# Patient Record
Sex: FEMALE | Race: WHITE | Employment: OTHER | URBAN - NONMETROPOLITAN AREA
[De-identification: names, ages, dates, MRNs, and addresses within clinical notes are randomized per-mention and may not be internally consistent; named-entity substitution may affect disease eponyms.]

---

## 2017-09-15 ENCOUNTER — HOSPITAL ENCOUNTER (OUTPATIENT)
Dept: MAMMOGRAPHY | Age: 47
Discharge: HOME | End: 2017-09-15
Payer: COMMERCIAL

## 2017-09-15 DIAGNOSIS — Z12.39 SCREENING BREAST EXAMINATION: ICD-10-CM

## 2017-09-15 PROCEDURE — G0202 SCR MAMMO BI INCL CAD: HCPCS

## 2018-11-30 ENCOUNTER — HOSPITAL ENCOUNTER (OUTPATIENT)
Dept: MAMMOGRAPHY | Age: 48
Discharge: HOME | End: 2018-11-30
Payer: COMMERCIAL

## 2018-11-30 DIAGNOSIS — Z12.39 SCREENING BREAST EXAMINATION: ICD-10-CM

## 2018-11-30 PROCEDURE — 77063 BREAST TOMOSYNTHESIS BI: CPT

## 2018-12-28 ENCOUNTER — HOSPITAL ENCOUNTER (EMERGENCY)
Age: 48
Discharge: HOME | End: 2018-12-28
Attending: EMERGENCY MEDICINE
Payer: COMMERCIAL

## 2018-12-28 ENCOUNTER — APPOINTMENT (OUTPATIENT)
Dept: GENERAL RADIOLOGY | Age: 48
End: 2018-12-28
Payer: COMMERCIAL

## 2018-12-28 VITALS
RESPIRATION RATE: 20 BRPM | BODY MASS INDEX: 53.22 KG/M2 | DIASTOLIC BLOOD PRESSURE: 70 MMHG | OXYGEN SATURATION: 100 % | WEIGHT: 293 LBS | TEMPERATURE: 98.3 F | SYSTOLIC BLOOD PRESSURE: 147 MMHG | HEART RATE: 73 BPM

## 2018-12-28 DIAGNOSIS — R07.89 ATYPICAL CHEST PAIN: Primary | ICD-10-CM

## 2018-12-28 LAB
ALBUMIN SERPL-MCNC: 3.1 GM/DL (ref 3.4–5)
ALP BLD-CCNC: 76 U/L (ref 46–116)
ALT SERPL-CCNC: 21 U/L (ref 14–63)
ANION GAP: 11 MEQ/L (ref 8–16)
AST SERPL-CCNC: 15 U/L (ref 15–37)
BASOPHILS # BLD: 0.4 % (ref 0–3)
BILIRUB SERPL-MCNC: 0.3 MG/DL (ref 0.2–1)
BUN BLDV-MCNC: 9 MG/DL (ref 7–18)
CHLORIDE BLD-SCNC: 104 MEQ/L (ref 98–107)
CO2: 27 MEQ/L (ref 21–32)
CREAT SERPL-MCNC: 0.8 MG/DL (ref 0.6–1.3)
EKG ATRIAL RATE: 68 BPM
EKG P AXIS: 61 DEGREES
EKG P-R INTERVAL: 164 MS
EKG Q-T INTERVAL: 428 MS
EKG QRS DURATION: 92 MS
EKG QTC CALCULATION (BAZETT): 455 MS
EKG R AXIS: 13 DEGREES
EKG T AXIS: 15 DEGREES
EKG VENTRICULAR RATE: 68 BPM
EOSINOPHILS RELATIVE PERCENT: 2.2 % (ref 0–4)
GFR, ESTIMATED: 81 ML/MIN/1.73M2
GLUCOSE BLD-MCNC: 101 MG/DL (ref 74–106)
HCT VFR BLD CALC: 34.2 % (ref 37–47)
HEMOGLOBIN: 11.6 GM/DL (ref 12–16)
INR BLD: 1.05 (ref 0.85–1.13)
LIPASE: 108 U/L (ref 73–393)
LYMPHOCYTES # BLD: 28.2 % (ref 15–47)
MCH RBC QN AUTO: 28.2 PG (ref 27–31)
MCHC RBC AUTO-ENTMCNC: 34 GM/DL (ref 33–37)
MCV RBC AUTO: 83 FL (ref 81–99)
MONOCYTES: 9.2 % (ref 0–12)
PDW BLD-RTO: 14.4 % (ref 11.5–14.5)
PLATELET # BLD: 264 THOU/MM3 (ref 130–400)
PMV BLD AUTO: 8.5 FL (ref 7.4–10.4)
POC CALCIUM: 8.4 MG/DL (ref 8.5–10.1)
POTASSIUM SERPL-SCNC: 3.8 MEQ/L (ref 3.5–5.1)
RBC # BLD: 4.12 MILL/MM3 (ref 4.2–5.4)
SEGS: 60 % (ref 43–75)
SODIUM BLD-SCNC: 142 MEQ/L (ref 136–145)
TOTAL PROTEIN: 6.8 GM/DL (ref 6.4–8.2)
TROPONIN I: < 0.017 NG/ML (ref 0.02–0.05)
WBC # BLD: 8.4 THOU/MM3 (ref 4.8–10.8)

## 2018-12-28 PROCEDURE — 93005 ELECTROCARDIOGRAM TRACING: CPT | Performed by: EMERGENCY MEDICINE

## 2018-12-28 PROCEDURE — 36415 COLL VENOUS BLD VENIPUNCTURE: CPT

## 2018-12-28 PROCEDURE — 85610 PROTHROMBIN TIME: CPT

## 2018-12-28 PROCEDURE — 71045 X-RAY EXAM CHEST 1 VIEW: CPT

## 2018-12-28 PROCEDURE — 2709999900 HC NON-CHARGEABLE SUPPLY

## 2018-12-28 PROCEDURE — 84484 ASSAY OF TROPONIN QUANT: CPT

## 2018-12-28 PROCEDURE — 80053 COMPREHEN METABOLIC PANEL: CPT

## 2018-12-28 PROCEDURE — 99285 EMERGENCY DEPT VISIT HI MDM: CPT

## 2018-12-28 PROCEDURE — 83690 ASSAY OF LIPASE: CPT

## 2018-12-28 PROCEDURE — 85025 COMPLETE CBC W/AUTO DIFF WBC: CPT

## 2018-12-28 PROCEDURE — 93010 ELECTROCARDIOGRAM REPORT: CPT | Performed by: INTERNAL MEDICINE

## 2018-12-28 RX ORDER — LEVOTHYROXINE SODIUM 0.1 MG/1
100 TABLET ORAL DAILY
COMMUNITY

## 2018-12-28 ASSESSMENT — PAIN SCALES - GENERAL: PAINLEVEL_OUTOF10: 0

## 2018-12-28 ASSESSMENT — HEART SCORE: ECG: 0

## 2021-04-01 ENCOUNTER — HOSPITAL ENCOUNTER (OUTPATIENT)
Dept: CT IMAGING | Age: 51
Discharge: HOME | End: 2021-04-01
Payer: COMMERCIAL

## 2021-04-01 DIAGNOSIS — R19.00 ABDOMINAL MASS, UNSPECIFIED ABDOMINAL LOCATION: ICD-10-CM

## 2021-04-01 PROCEDURE — 6360000004 HC RX CONTRAST MEDICATION: Performed by: FAMILY MEDICINE

## 2021-04-01 PROCEDURE — 74177 CT ABD & PELVIS W/CONTRAST: CPT

## 2021-04-01 RX ADMIN — IOHEXOL 50 ML: 240 INJECTION, SOLUTION INTRATHECAL; INTRAVASCULAR; INTRAVENOUS; ORAL at 18:13

## 2021-04-01 RX ADMIN — IOPAMIDOL 100 ML: 755 INJECTION, SOLUTION INTRAVENOUS at 18:13

## 2021-04-12 ENCOUNTER — OFFICE VISIT (OUTPATIENT)
Dept: SURGERY | Age: 51
End: 2021-04-12
Payer: COMMERCIAL

## 2021-04-12 VITALS
HEIGHT: 68 IN | TEMPERATURE: 97 F | HEART RATE: 90 BPM | SYSTOLIC BLOOD PRESSURE: 133 MMHG | RESPIRATION RATE: 15 BRPM | OXYGEN SATURATION: 98 % | BODY MASS INDEX: 44.41 KG/M2 | DIASTOLIC BLOOD PRESSURE: 72 MMHG | WEIGHT: 293 LBS

## 2021-04-12 DIAGNOSIS — R22.2 ABDOMINAL WALL MASS: Primary | ICD-10-CM

## 2021-04-12 DIAGNOSIS — E66.3 PATIENT OVERWEIGHT: ICD-10-CM

## 2021-04-12 PROCEDURE — 99203 OFFICE O/P NEW LOW 30 MIN: CPT | Performed by: SURGERY

## 2021-04-12 NOTE — PROGRESS NOTES
Daniele Eden MD   General Surgery  New Patient Evaluation in Office  Pt Name: Darwin Renteria  Date of Birth 1970   Today's Date: 4/12/2021  Medical Record Number: [id number]  Referring Provider: Ruben Ruffin MD  Primary Care Provider: Arpan Amador MD  Chief Complaint:  Chief Complaint   Patient presents with   [facility] Surgical Consult     new patient--ref by Dr. Jessica Ruelas for lump on right side of abdomen-CT abd/pelvis 4/1       ASSESSMENT      1. Abdominal wall mass    No clinical mass which requires excision   2. History of tachycardia  3. Overweight BMI 53  PLANS      1. CT imaging reviewed and reviewed with the patient. No CT evidence of mass or abdominal wall mass on imaging. 2.  Clinical examination no clinically significant palpable abdominal wall mass no pain or tenderness. No surgical intervention recommended. Patient very satisfied. 1.  Obtain old records from gastric bypass surgery when she stated she had a cyst removed. She is uncertain what type of cyst she had removed. Bariatric center where she had surgery is no longer open this may be difficult. 4.  Patient wishes to follow-up as needed. Call with any changes. Rogelio Maravilla is a 46y.o. year old female who is presenting today in the office for evaluation of a \" Lump\" on the right side of the abdomen. Colette Barker had gastric bypass surgery in [place] in 2004. She stated she had a cyst removed at that time. She states she feels like she has felt a lump on the right side of the abdomen. She states this is in a similar area to where this cyst was removed before. She cannot really state if it was an abdominal wall cyst or an intra-abdominal cyst that was removed at the time of her bariatric surgery. She really has no symptoms of pain. On physical examination there is no hernia palpable and no discrete abdominal wall mass palpable. She may be feeling some fat or fat necrosis.   CT imaging reviewed and reviewed with patient no corresponding abnormalities in the area where she points. Other insignificant findings. Past Medical History  Past Medical History:   Diagnosis Date    Tachycardia     Dr. Nile Hayes Thyroid disease        Past Surgical History  Past Surgical History:   Procedure Laterality Date   [facility]- Dr. Zafar Duran  2004    with cyst removal-bariatric center in [place], [place]- Dr. Von Tinajero       Medications  Current Outpatient Medications   Medication Sig Dispense Refill    levothyroxine (SYNTHROID) 100 MCG tablet Take 100 mcg by mouth Daily      metoprolol tartrate (LOPRESSOR) 25 MG tablet Take 25 mg by mouth 2 times daily      Multiple Minerals-Vitamins (JOHNNY-MAG ZINC II PO) Take by mouth      Levonorgest-Eth Estrad 91-Day (SEASONIQUE PO) Take  by mouth daily.  acetaminophen (TYLENOL) 325 MG tablet Take 650 mg by mouth every 6 hours as needed for Pain       No current facility-administered medications for this visit.       Allergies     Allergies   Allergen Reactions    Aspirin        Family History  Family History   Problem Relation Age of Onset    High Blood Pressure Mother     Diabetes Father     High Blood Pressure Father     Heart Disease Father        SocialHistory  Social History     Socioeconomic History    Marital status: Single     Spouse name: Not on file    Number of children: Not on file    Years of education: Not on file    Highest education level: Not on file   Occupational History    Not on file   Social Needs    Financial resource strain: Not on file    Food insecurity     Worry: Not on file     Inability: Not on file    Transportation needs     Medical: Not on file     Non-medical: Not on file   Tobacco Use    Smoking status: Former Smoker     Quit date: 04/2011     Years since quitting: 10.0    Smokeless tobacco: Never Used   Substance and Sexual Activity    Alcohol use: Yes     Comment: Occasional wine    Drug use: No    Sexual activity: Not on file   Lifestyle    Physical activity     Days per week: Not on file     Minutes per session: Not on file    Stress: Not on file   Relationships    Social connections     Talks on phone: Not on file     Gets together: Not on file     Attends Gnosticism service: Not on file     Active member of club or organization: Not on file     Attends meetings of clubs or organizations: Not on file     Relationship status: Not on file    Intimate partner violence     Fear of current or ex partner: Not on file     Emotionally abused: Not on file     Physically abused: Not on file     Forced sexual activity: Not on file   Other Topics Concern    Not on file   Social History Narrative    Not on file           Review of Systems  Review of Systems   Constitutional: Negative for activity change, appetite change and chills. HENT: Negative for congestion and sore throat. Respiratory: Negative for cough and chest tightness. Cardiovascular: Negative for chest pain. Gastrointestinal: Negative for abdominal pain, constipation and diarrhea. Endocrine: Positive for heat intolerance. Negative for polydipsia. Genitourinary: Negative for dysuria. Musculoskeletal: Positive for arthralgias. Negative for myalgias. Skin: Negative for color change and rash. Neurological: Negative for light-headedness and headaches. Hematological: Negative for adenopathy. Does not bruise/bleed easily. Psychiatric/Behavioral: Negative for agitation and confusion. OBJECTIVE     /72 (Site: Right Lower Arm, Position: Sitting, Cuff Size: Medium Adult)   Pulse 90   Temp 97 °F (36.1 °C) (Tympanic)   Resp 15   Ht 5' 8\" (1.727 m)   Wt (!) 350 lb (158.8 kg)   SpO2 98%   BMI 53.22 kg/m²      Physical Exam  Vitals signs reviewed. Constitutional:       Appearance: Normal appearance. She is well-developed. She is obese. She is not ill-appearing or diaphoretic. HENT:      Head: Normocephalic and atraumatic. Nose: No congestion. Eyes:      General: No scleral icterus. Extraocular Movements: Extraocular movements intact. Pupils: Pupils are equal, round, and reactive to light. Neck:      Thyroid: No thyromegaly. Vascular: No JVD. Trachea: No tracheal deviation. Comments: No jugular venous distention  Cardiovascular:      Rate and Rhythm: Normal rate. Heart sounds: Normal heart sounds. Pulmonary:      Effort: Pulmonary effort is normal. No respiratory distress. Abdominal:      General: There is no distension. Palpations: Abdomen is soft. There is no mass. Tenderness: There is no abdominal tenderness. There is no guarding or rebound. Hernia: No hernia is present. Musculoskeletal: Normal range of motion. Skin:     General: Skin is warm and dry. Coloration: Skin is not jaundiced. Findings: No rash. Neurological:      General: No focal deficit present. Mental Status: She is alert and oriented to person, place, and time. Cranial Nerves: No cranial nerve deficit. Psychiatric:         Mood and Affect: Mood normal.         Thought Content: Thought content normal.         Lab Results   Component Value Date    WBC 8.4 12/28/2018    HGB 11.6 (L) 12/28/2018    HCT 34.2 (L) 12/28/2018     12/28/2018    ALT 21 12/28/2018    AST 15 12/28/2018     12/28/2018    K 3.8 12/28/2018     12/28/2018    CREATININE 0.8 12/28/2018    BUN 9 12/28/2018    CO2 27 12/28/2018    INR 1.05 12/28/2018        CLINICAL INFORMATION: Abdominal mass, unspecified abdominal location .  Status post cholecystectomy and gastric bypass surgery        COMPARISON: None.       TECHNIQUE: Axial 5 mm CT images were obtained through the abdomen and pelvis after the administration of intravenous contrast. Coronal and sagittal reconstructions were obtained.       All CT scans at this facility use dose modulation, iterative reconstruction, and/or weight-based dosing when appropriate to reduce radiation dose to as low as reasonably achievable.       FINDINGS:           The visualized aspects of the lung bases are clear.   The base of the heart is within appropriate limits.       There is mild diffuse fatty replacement in the liver. The spleen is normal. The adrenals  are normal. This is slightly atrophic pancreas. The  patient is status post cholecystectomy.    There are parapelvic cysts versus hydronephrosis involving the left    and to a lesser extent right kidneys. There is a small parenchymal cyst in the left kidney. The left ureter appears to be normal. .. No renal masses are noted.   Corey Motto is a small hiatal hernia       No abnormalities of the small bowel loops are noted.  The IVC and aorta are of normal caliber. There is no adenopathy.       The urinary bladder is normal. This possible fibroid arising from the uterus measuring 3.2 x 2.7 cm in size. There is no pelvic free fluid.  The colon is within normal limits.  There is no adenopathy. No other degenerative changes in the lumbar spine. .                   Impression       1. Status post cholecystectomy and gastric bypass surgery. 2. Mild diffuse fatty replacement in the liver. 3. Slightly atrophic pancreas. 4. Parapelvic cyst versus hydronephrosis involving the left internal Lasix and right kidneys. Small parenchymal cyst in the left kidney. 5. Lumbar spondylosis. 6. Possible fibroid arising from the uterus on the left side measuring 3.2 x 3.7 cm in size. 7. Otherwise negative CT scan of the abdomen and pelvis. .                   **This report has been created using voice recognition software. It may contain minor errors which are inherent in voice recognition technology. **       Final report electronically signed by DR Isidra Andres on 4/2/2021 9:09 AM         Imaging reviewed and reviewed with patient

## 2021-04-12 NOTE — LETTER
[address] [facility]  [place] [address] [address] [facility] [address]  Phone: [phone]  Fax: [phone]    Lamar Lundy MD        April 14, 2021    Patient: Anisa Storm   MR Number: [id number]   YOB: 1970   Date of Visit: 4/12/2021     Dear Dr. Juma Barba,    Thank you for the request for consultation for Anisa Storm to me for the evaluation of a possible abdominal wall mass. Below are the relevant portions of my assessment and plan of care. 1. Abdominal wall mass    No clinical mass which requires excision    1. CT imaging reviewed and reviewed with the patient. No CT evidence of mass or abdominal wall mass on imaging. 2.  Clinical examination no clinically significant palpable abdominal wall mass no pain or tenderness. No surgical intervention recommended. Patient very satisfied. 1.  Obtain old records from gastric bypass surgery when she stated she had a cyst removed. She is uncertain what type of cyst she had removed. Bariatric center where she had surgery is no longer open this may be difficult. 4.  Patient wishes to follow-up as needed. Call with any changes. If you have questions, please do not hesitate to call me.     Sincerely,          Lamar Lundy MD

## 2021-04-14 ASSESSMENT — ENCOUNTER SYMPTOMS
CONSTIPATION: 0
COUGH: 0
CHEST TIGHTNESS: 0
SORE THROAT: 0
COLOR CHANGE: 0
DIARRHEA: 0
ABDOMINAL PAIN: 0

## 2021-05-02 LAB
A/G RATIO: NORMAL
ALBUMIN SERPL-MCNC: 3.9 G/DL
ALP BLD-CCNC: 80 U/L
ALT SERPL-CCNC: 23 U/L
ANION GAP: 14
AST SERPL-CCNC: 20 U/L
BASOPHILS %: 1.2
BASOPHILS ABSOLUTE: 0.1 /ΜL
BILIRUB SERPL-MCNC: 0.6 MG/DL (ref 0.1–1.4)
BILIRUBIN DIRECT: 0.1 MG/DL
BILIRUBIN, INDIRECT: NORMAL
BUN BLDV-MCNC: 8 MG/DL
CALCIUM SERPL-MCNC: 8.9 MG/DL
CHLORIDE BLD-SCNC: 105 MMOL/L
CHOLESTEROL, TOTAL: 161 MG/DL
CHOLESTEROL/HDL RATIO: 3.6
CO2: 28 MMOL/L
CREAT SERPL-MCNC: 0.71 MG/DL
EOSINOPHILS %: 1.7
EOSINOPHILS ABSOLUTE: 0.1 /ΜL
GFR AFRICAN AMERICAN: >60
GFR NON-AFRICAN AMERICAN: >60
GLOBULIN: NORMAL
GLUCOSE: 91
HCT: 36.6 %
HDLC SERPL-MCNC: 45 MG/DL (ref 35–70)
HGB: 12.1
LDL CHOLESTEROL CALCULATED: 102 MG/DL (ref 0–160)
LDL/HDL RATIO: 2.3
LYMPHOCYTE %: 25.2
LYMPHOCYTES ABSOLUTE: 1.9 /ΜL
MCH RBC QN AUTO: 28 PG
MCHC RBC AUTO-ENTMCNC: 33.1 G/DL
MCV RBC AUTO: 85 FL
MONOCYTES %: 10.5
MONOCYTES ABSOLUTE: 0.8 /ΜL
NEUTROPHILS %: 61.4
NEUTROPHILS ABSOLUTE: 4.6 /ΜL
PDW BLD-RTO: 15.7 %
PLATELET COUNT: 250
PMV BLD AUTO: 11.5 FL
POTASSIUM SERPL-SCNC: 4.4 MMOL/L
PROTEIN TOTAL: 7.1 G/DL
RBC: 4.33
SODIUM BLD-SCNC: 147 MMOL/L
TRIGL SERPL-MCNC: 70 MG/DL
VLDLC SERPL CALC-MCNC: 14 MG/DL
WBC: 7.5

## 2021-09-20 ENCOUNTER — ANESTHESIA EVENT (OUTPATIENT)
Dept: ENDOSCOPY | Age: 51
End: 2021-09-20
Payer: COMMERCIAL

## 2021-09-20 ENCOUNTER — HOSPITAL ENCOUNTER (OUTPATIENT)
Age: 51
Setting detail: OUTPATIENT SURGERY
Discharge: HOME | End: 2021-09-20
Attending: INTERNAL MEDICINE | Admitting: INTERNAL MEDICINE
Payer: COMMERCIAL

## 2021-09-20 ENCOUNTER — ANESTHESIA (OUTPATIENT)
Dept: ENDOSCOPY | Age: 51
End: 2021-09-20
Payer: COMMERCIAL

## 2021-09-20 VITALS
SYSTOLIC BLOOD PRESSURE: 132 MMHG | RESPIRATION RATE: 10 BRPM | DIASTOLIC BLOOD PRESSURE: 81 MMHG | OXYGEN SATURATION: 99 %

## 2021-09-20 VITALS
HEIGHT: 67 IN | RESPIRATION RATE: 18 BRPM | HEART RATE: 64 BPM | WEIGHT: 293 LBS | OXYGEN SATURATION: 98 % | BODY MASS INDEX: 45.99 KG/M2 | DIASTOLIC BLOOD PRESSURE: 74 MMHG | SYSTOLIC BLOOD PRESSURE: 161 MMHG | TEMPERATURE: 96.7 F

## 2021-09-20 PROCEDURE — 2500000003 HC RX 250 WO HCPCS: Performed by: NURSE ANESTHETIST, CERTIFIED REGISTERED

## 2021-09-20 PROCEDURE — 3609010600 HC COLONOSCOPY POLYPECTOMY SNARE/COLD BIOPSY: Performed by: INTERNAL MEDICINE

## 2021-09-20 PROCEDURE — 2720000010 HC SURG SUPPLY STERILE: Performed by: INTERNAL MEDICINE

## 2021-09-20 PROCEDURE — 3700000001 HC ADD 15 MINUTES (ANESTHESIA): Performed by: INTERNAL MEDICINE

## 2021-09-20 PROCEDURE — 7100000010 HC PHASE II RECOVERY - FIRST 15 MIN: Performed by: INTERNAL MEDICINE

## 2021-09-20 PROCEDURE — 6360000002 HC RX W HCPCS: Performed by: NURSE ANESTHETIST, CERTIFIED REGISTERED

## 2021-09-20 PROCEDURE — 88305 TISSUE EXAM BY PATHOLOGIST: CPT

## 2021-09-20 PROCEDURE — 3700000000 HC ANESTHESIA ATTENDED CARE: Performed by: INTERNAL MEDICINE

## 2021-09-20 PROCEDURE — 3609019800 HC COLONOSCOPY WITH SUBMUCOSAL INJECTION: Performed by: INTERNAL MEDICINE

## 2021-09-20 PROCEDURE — 2580000003 HC RX 258: Performed by: INTERNAL MEDICINE

## 2021-09-20 PROCEDURE — 2709999900 HC NON-CHARGEABLE SUPPLY: Performed by: INTERNAL MEDICINE

## 2021-09-20 PROCEDURE — 7100000011 HC PHASE II RECOVERY - ADDTL 15 MIN: Performed by: INTERNAL MEDICINE

## 2021-09-20 RX ORDER — KETAMINE HYDROCHLORIDE 50 MG/ML
INJECTION, SOLUTION, CONCENTRATE INTRAMUSCULAR; INTRAVENOUS PRN
Status: DISCONTINUED | OUTPATIENT
Start: 2021-09-20 | End: 2021-09-20 | Stop reason: SDUPTHER

## 2021-09-20 RX ORDER — SODIUM CHLORIDE 0.9 % (FLUSH) 0.9 %
5-40 SYRINGE (ML) INJECTION PRN
Status: DISCONTINUED | OUTPATIENT
Start: 2021-09-20 | End: 2021-09-20 | Stop reason: HOSPADM

## 2021-09-20 RX ORDER — LIDOCAINE HYDROCHLORIDE 20 MG/ML
INJECTION, SOLUTION INFILTRATION; PERINEURAL PRN
Status: DISCONTINUED | OUTPATIENT
Start: 2021-09-20 | End: 2021-09-20 | Stop reason: SDUPTHER

## 2021-09-20 RX ORDER — SODIUM CHLORIDE 9 MG/ML
25 INJECTION, SOLUTION INTRAVENOUS PRN
Status: DISCONTINUED | OUTPATIENT
Start: 2021-09-20 | End: 2021-09-20 | Stop reason: HOSPADM

## 2021-09-20 RX ORDER — PROPOFOL 10 MG/ML
INJECTION, EMULSION INTRAVENOUS PRN
Status: DISCONTINUED | OUTPATIENT
Start: 2021-09-20 | End: 2021-09-20 | Stop reason: SDUPTHER

## 2021-09-20 RX ORDER — SODIUM CHLORIDE 0.9 % (FLUSH) 0.9 %
5-40 SYRINGE (ML) INJECTION EVERY 12 HOURS SCHEDULED
Status: DISCONTINUED | OUTPATIENT
Start: 2021-09-20 | End: 2021-09-20 | Stop reason: HOSPADM

## 2021-09-20 RX ADMIN — KETAMINE HYDROCHLORIDE 10 MG: 50 INJECTION, SOLUTION INTRAMUSCULAR; INTRAVENOUS at 09:54

## 2021-09-20 RX ADMIN — SODIUM CHLORIDE 1000 ML: 9 INJECTION, SOLUTION INTRAVENOUS at 08:40

## 2021-09-20 RX ADMIN — LIDOCAINE HYDROCHLORIDE 100 MG: 20 INJECTION, SOLUTION INFILTRATION; PERINEURAL at 09:05

## 2021-09-20 RX ADMIN — PROPOFOL 550 MG: 10 INJECTION, EMULSION INTRAVENOUS at 09:13

## 2021-09-20 RX ADMIN — PROPOFOL 100 MG: 10 INJECTION, EMULSION INTRAVENOUS at 09:05

## 2021-09-20 RX ADMIN — PROPOFOL 50 MG: 10 INJECTION, EMULSION INTRAVENOUS at 09:08

## 2021-09-20 RX ADMIN — KETAMINE HYDROCHLORIDE 20 MG: 50 INJECTION, SOLUTION INTRAMUSCULAR; INTRAVENOUS at 09:25

## 2021-09-20 RX ADMIN — KETAMINE HYDROCHLORIDE 20 MG: 50 INJECTION, SOLUTION INTRAMUSCULAR; INTRAVENOUS at 09:40

## 2021-09-20 ASSESSMENT — PAIN SCALES - GENERAL
PAINLEVEL_OUTOF10: 0
PAINLEVEL_OUTOF10: 0

## 2021-09-20 ASSESSMENT — ENCOUNTER SYMPTOMS: SHORTNESS OF BREATH: 0

## 2021-09-20 ASSESSMENT — PAIN - FUNCTIONAL ASSESSMENT: PAIN_FUNCTIONAL_ASSESSMENT: 0-10

## 2021-09-20 NOTE — PROGRESS NOTES
Colonoscopy completed. Pictures taken. 1 polyp found. Polyp removed with cold snare. Specimen fixed in formalin. Specimen labeled and sent to lab. 5 ml Spot injected into polyp site. Patient tolerated well. Scope# [id number] used.

## 2021-09-20 NOTE — ANESTHESIA POSTPROCEDURE EVALUATION
Department of Anesthesiology  Postprocedure Note    Patient: Wanda Donahue  MRN: [id number]  YOB: 1970  Date of evaluation: 9/20/2021  Time:  10:14 AM     Procedure Summary     Date: 09/20/21 Room / Location: [address] [place] [address] / [address] [place] [address]    Anesthesia Start: 0296 Anesthesia Stop: 1004    Procedures:       COLONOSCOPY POLYPECTOMY SNARE/COLD BIOPSY (N/A )      COLONOSCOPY SUBMUCOSAL/BOTOX INJECTION Diagnosis: (RECTAL BLEED, ANAL PAIN)    Surgeons: Dina Stein MD Responsible Provider: Humble White MD    Anesthesia Type: MAC, general ASA Status: 3          Anesthesia Type: MAC, general    Malcolm Phase I: Malcolm Score: 10    Malcolm Phase II: Malcolm Score: 10    Last vitals: Reviewed and per EMR flowsheets.        Anesthesia Post Evaluation    Patient location during evaluation: bedside  Patient participation: complete - patient participated  Level of consciousness: awake and alert  Pain score: 0  Airway patency: patent  Nausea & Vomiting: no nausea and no vomiting  Complications: no  Cardiovascular status: hemodynamically stable  Respiratory status: room air, spontaneous ventilation and acceptable  Hydration status: stable

## 2021-09-20 NOTE — BRIEF OP NOTE
Brief Postoperative Note      Patient: Anaya Mcdowell  YOB: 1970  MRN: [id number]    Date of Procedure: 9/20/2021    Pre-Op Diagnosis: RECTAL BLEED, ANAL PAIN    Post-Op Diagnosis: Same       Procedure(s):  COLONOSCOPY POLYPECTOMY SNARE/COLD BIOPSY  COLONOSCOPY SUBMUCOSAL/BOTOX INJECTION    Surgeon(s):   Chetan Elizabeth MD    Assistant:  * No surgical staff found *    Anesthesia: Monitor Anesthesia Care    Estimated Blood Loss (mL): Minimal    Complications: None    Specimens:   ID Type Source Tests Collected by Time Destination   A : transverse polyp Tissue Colon SURGICAL PATHOLOGY Chetan Elizabeth MD 9/20/2021 4465        Implants:  * No implants in log *      Drains: * No LDAs found *    Findings: above    Electronically signed by Chetan Elizabeth MD on 9/20/2021 at 10:04 AM

## 2021-09-20 NOTE — OP NOTE
Operative Note      Patient: Ana Lilia Altman  YOB: 1970  MRN: [id number]    Date of Procedure: 9/20/2021    Pre-Op Diagnosis: RECTAL BLEED, ANAL PAIN    Post-Op Diagnosis: Same       Procedure(s):  COLONOSCOPY POLYPECTOMY SNARE/COLD BIOPSY  COLONOSCOPY SUBMUCOSAL/BOTOX INJECTION    Surgeon(s):   Demetrice Doyle MD    Assistant:   * No surgical staff found *    Anesthesia: Monitor Anesthesia Care    Estimated Blood Loss (mL): Minimal    Complications: None    Specimens:   ID Type Source Tests Collected by Time Destination   A : transverse polyp Tissue Colon SURGICAL PATHOLOGY Demetrice Doyle MD 9/20/2021 5555        Implants:  * No implants in log *      Drains: * No LDAs found *    Findings: above    Detailed Description of Procedure:   Dictated      Electronically signed by Demetrice Doyle MD on 9/20/2021 at 10:02 AM

## 2021-09-20 NOTE — H&P
[address] [place] [address]                              HISTORY AND PHYSICAL    PATIENT NAME: Thierry Lutz                 :        1970  MED REC NO:   [id number]                           ROOM:  ACCOUNT NO:   [de-identified]                           ADMIT DATE: 2021  PROVIDER:     Eagle Monet. Sirena Heller M.D. REASON FOR VISIT:  Anal pain and rectal bleeding. HISTORY OF PRESENT ILLNESS:  The patient is a 55-year-old female who has  had blood per rectum as well as some anal pain, suspected anal fissure. We are thus planning colonoscopy. PAST MEDICAL HISTORY:  Previously listed and unchanged. MEDICATIONS:  Previously listed and unchanged. ALLERGIES:  PREVIOUSLY LISTED AND UNCHANGED. SOCIAL HISTORY:  Previously listed and unchanged. FAMILY HISTORY:  Previously listed and unchanged. REVIEW OF SYSTEMS:  Previously listed and unchanged. PHYSICAL EXAMINATION:  GENERAL:  Awake, alert, and oriented x3. VITAL SIGNS:  The patient is afebrile. Heart rate 83, respiratory rate  16, blood pressure is 170/84, oxygen saturation 97%. HEENT:  Normocephalic and atraumatic. NECK:  Supple. No JVD. LUNGS:  Clear. HEART:  Regular rate. ABDOMEN:  Soft, nontender. No guarding. RECTAL:  Will be done with colonoscopy. EXTREMITIES:  Without edema. NEUROLOGIC:  Awake, alert, and oriented x3. IMPRESSION:  1. Rectal bleeding. 2.  Anal pain. PLAN:  Colonoscopy today. BOLIVAR Frankel M.D.    D: 2021 9:01:31       T: 2021 9:38:53     [email or web address]  Job#: [id number]     Doc#: [id number]    CC:

## 2021-09-20 NOTE — ANESTHESIA PRE PROCEDURE
Department of Anesthesiology  Preprocedure Note       Name:  Ronald Tello   Age:  46 y.o.  :  1970                                          MRN:  [id number]         Date:  2021      Surgeon: June Coelho): Jennifer Ferreira MD    Procedure: Procedure(s):  COLONOSCOPY    Medications prior to admission:   Prior to Admission medications    Medication Sig Start Date End Date Taking? Authorizing Provider   valACYclovir (VALTREX) 500 MG tablet Take 1 tablet by mouth 3 times daily as needed (COLD SORES) 21  Yes Tejinder Rodrigues MD   hydrocortisone (ANUSOL-HC) 2.5 % CREA rectal cream Apply to affected are bid prn x 5-7 days 21  Yes Fabi Andrade APRN - CNP   levothyroxine (SYNTHROID) 100 MCG tablet Take 100 mcg by mouth Daily   Yes Historical Provider, MD   metoprolol tartrate (LOPRESSOR) 25 MG tablet Take 25 mg by mouth 2 times daily   Yes Historical Provider, MD   acetaminophen (TYLENOL) 325 MG tablet Take 650 mg by mouth every 6 hours as needed for Pain   Yes Historical Provider, MD   Multiple Minerals-Vitamins (JOHNNY-MAG ZINC II PO) Take by mouth   Yes Historical Provider, MD   Levonorgest-Eth Estrad 91-Day (SEASONIQUE PO) Take  by mouth daily. Yes Historical Provider, MD       Current medications:    Current Facility-Administered Medications   Medication Dose Route Frequency Provider Last Rate Last Admin    sodium chloride flush 0.9 % injection 5-40 mL  5-40 mL IntraVENous 2 times per day Jennifer Ferreira MD        sodium chloride flush 0.9 % injection 5-40 mL  5-40 mL IntraVENous PRN Jennifer Ferreira MD        0.9 % sodium chloride infusion  25 mL IntraVENous PRN Jennifer Ferreira  mL/hr at 21 0840 1,000 mL at 21 0840       Allergies:     Allergies   Allergen Reactions    Aspirin        Problem List:    Patient Active Problem List   Diagnosis Code    Hypokalemia E87.6    Knee pain, left, spur related M25.562    Torn meniscusleft S83.209A    Heart burn, frequent R12    Arrhythmia I49.9    Patient overweight E66.3    Gall bladder disease K82.9    Heart palpitations R00.2    Gastritis and duodenitis K29.90    Diarrhea R19.7       Past Medical History:        Diagnosis Date    Tachycardia     Dr. Leander Maza Thyroid disease        Past Surgical History:        Procedure Laterality Date   Luly Alexis Dr. Like   Roger  2004    with cyst removal-bariatric center in [facility]  Minus Clonts       Social History:    Social History     Tobacco Use    Smoking status: Former Smoker     Quit date: 04/2011     Years since quitting: 10.4    Smokeless tobacco: Never Used   Substance Use Topics    Alcohol use: Yes     Comment: Occasional wine                                Counseling given: Not Answered      Vital Signs (Current):   Vitals:    09/20/21 0802   BP: (!) 170/84   Pulse: 83   Resp: 16   Temp: 36.1 °C (97 °F)   SpO2: 97%   Weight: 300 lb (136.1 kg)   Height: 5' 6.5\" (1.689 m)                                              BP Readings from Last 3 Encounters:   09/20/21 (!) 170/84   07/16/21 134/82   04/12/21 133/72       NPO Status: Time of last liquid consumption: 0015                        Time of last solid consumption: 2000                        Date of last liquid consumption: 09/20/21                        Date of last solid food consumption: 09/18/21    BMI:   Wt Readings from Last 3 Encounters:   09/20/21 300 lb (136.1 kg)   04/12/21 (!) 350 lb (158.8 kg)   12/28/18 (!) 350 lb (158.8 kg)     Body mass index is 47.7 kg/m².     CBC:   Lab Results   Component Value Date    WBC 8.4 12/28/2018    RBC 4.12 12/28/2018    HGB 11.6 12/28/2018    HCT 34.2 12/28/2018    MCV 83.0 12/28/2018    RDW 14.4 12/28/2018     12/28/2018       CMP:   Lab Results   Component Value Date     12/28/2018    K 3.8 12/28/2018     12/28/2018    CO2 27 12/28/2018    BUN 9 12/28/2018    CREATININE 0.8 12/28/2018    GLUCOSE 101

## 2021-09-20 NOTE — PROGRESS NOTES
Recovery mode, denies discomfort. Passing gas, taking fluids. Dr. Racheal Smith discussed findings with pt and . Discharge instructions provided and understanding verbalized.

## 2022-01-24 PROBLEM — L01.00 IMPETIGO: Status: ACTIVE | Noted: 2020-08-19

## 2022-05-20 VITALS — DIASTOLIC BLOOD PRESSURE: 77 MMHG | SYSTOLIC BLOOD PRESSURE: 150 MMHG | WEIGHT: 293 LBS | BODY MASS INDEX: 51.09 KG/M2

## 2022-05-20 RX ORDER — NITROGLYCERIN 0.4 MG/1
0.4 TABLET SUBLINGUAL PRN
COMMUNITY
End: 2022-09-28 | Stop reason: SDUPTHER

## 2022-08-22 ENCOUNTER — HOSPITAL ENCOUNTER (OUTPATIENT)
Dept: MAMMOGRAPHY | Age: 52
Discharge: HOME | End: 2022-08-22
Payer: COMMERCIAL

## 2022-08-22 ENCOUNTER — HOSPITAL ENCOUNTER (OUTPATIENT)
Dept: WOMENS IMAGING | Age: 52
Discharge: HOME | End: 2022-08-22

## 2022-08-22 DIAGNOSIS — Z00.6 ENCOUNTER FOR EXAMINATION FOR NORMAL COMPARISON OR CONTROL IN CLINICAL RESEARCH PROGRAM: ICD-10-CM

## 2022-08-22 DIAGNOSIS — Z12.39 SCREENING BREAST EXAMINATION: ICD-10-CM

## 2022-08-22 PROCEDURE — 77063 BREAST TOMOSYNTHESIS BI: CPT

## 2022-09-22 PROBLEM — R00.0 TACHYCARDIA: Status: ACTIVE | Noted: 2022-09-22

## 2022-09-22 PROBLEM — B00.1 RECURRENT COLD SORES: Status: ACTIVE | Noted: 2022-09-22

## 2022-09-22 PROBLEM — L01.00 IMPETIGO: Status: RESOLVED | Noted: 2020-08-19 | Resolved: 2022-09-22

## 2022-09-22 PROBLEM — M23.206 OLD TEAR OF MENISCUS OF RIGHT KNEE: Status: ACTIVE | Noted: 2022-09-22

## 2022-09-28 ENCOUNTER — OFFICE VISIT (OUTPATIENT)
Dept: CARDIOLOGY CLINIC | Age: 52
End: 2022-09-28
Payer: COMMERCIAL

## 2022-09-28 VITALS
HEART RATE: 70 BPM | BODY MASS INDEX: 47.09 KG/M2 | SYSTOLIC BLOOD PRESSURE: 128 MMHG | DIASTOLIC BLOOD PRESSURE: 84 MMHG | RESPIRATION RATE: 20 BRPM | HEIGHT: 66 IN | WEIGHT: 293 LBS

## 2022-09-28 DIAGNOSIS — R00.0 TACHYCARDIA: Primary | ICD-10-CM

## 2022-09-28 DIAGNOSIS — E78.5 DYSLIPIDEMIA (HIGH LDL; LOW HDL): ICD-10-CM

## 2022-09-28 PROCEDURE — 93000 ELECTROCARDIOGRAM COMPLETE: CPT | Performed by: INTERNAL MEDICINE

## 2022-09-28 PROCEDURE — 99213 OFFICE O/P EST LOW 20 MIN: CPT | Performed by: INTERNAL MEDICINE

## 2022-09-28 RX ORDER — VALACYCLOVIR HYDROCHLORIDE 1 G/1
TABLET, FILM COATED ORAL
COMMUNITY
Start: 2022-09-24

## 2022-09-28 RX ORDER — NITROGLYCERIN 0.4 MG/1
0.4 TABLET SUBLINGUAL PRN
Qty: 25 TABLET | Refills: 3 | Status: SHIPPED | OUTPATIENT
Start: 2022-09-28

## 2022-09-28 ASSESSMENT — ENCOUNTER SYMPTOMS
ABDOMINAL DISTENTION: 0
VOMITING: 0
STRIDOR: 0
COLOR CHANGE: 0
ANAL BLEEDING: 0
VOICE CHANGE: 0
ABDOMINAL PAIN: 0
BLOOD IN STOOL: 0
APNEA: 0
TROUBLE SWALLOWING: 0
SHORTNESS OF BREATH: 0
WHEEZING: 0
NAUSEA: 0
CHEST TIGHTNESS: 0
COUGH: 0
CHOKING: 0

## 2022-09-28 NOTE — PROGRESS NOTES
[address] [facility] [[email or web address]]  [place] [address]  Dept: [phone]  Loc: [phone]     9/28/2022       Arthur Boas is here today for   Chief Complaint   Patient presents with    Follow-up           Referring Physician:  No ref. provider found     Patient Active Problem List   Diagnosis    Knee pain, left, spur related    Old tear of meniscus of left knee    Heart burn, frequent    Arrhythmia    Hypothyroidism    Chest pain    Recurrent cold sores    Tachycardia    Old tear of meniscus of right knee       Review of Systems   Constitutional:  Negative for activity change, appetite change, fatigue, fever and unexpected weight change. HENT:  Negative for congestion, trouble swallowing and voice change. Eyes:  Negative for visual disturbance. Respiratory:  Negative for apnea, cough, choking, chest tightness, shortness of breath, wheezing and stridor. Cardiovascular:  Negative for chest pain and leg swelling. Gastrointestinal:  Negative for abdominal distention, abdominal pain, anal bleeding, blood in stool, nausea and vomiting. Endocrine: Negative for cold intolerance and heat intolerance. Genitourinary:  Negative for hematuria. Musculoskeletal:  Negative for arthralgias, gait problem, joint swelling and myalgias. Skin:  Negative for color change and rash. Allergic/Immunologic: Negative for environmental allergies and food allergies. Neurological:  Negative for dizziness, tremors, syncope, facial asymmetry, weakness, light-headedness, numbness and headaches. Hematological:  Does not bruise/bleed easily. Psychiatric/Behavioral:  Negative for agitation, behavioral problems and sleep disturbance.        Past Medical History:   Diagnosis Date    Hypertension        Allergies   Allergen Reactions    Aspirin Other (See Comments)     Hx gastric bypass    Ibuprofen Other (See Comments)     Hx gastric bypass Current Outpatient Medications   Medication Sig Dispense Refill    valACYclovir (VALTREX) 1 g tablet take 2 tablets by mouth twice a day for 1 day      metoprolol tartrate (LOPRESSOR) 25 MG tablet Take 1 tablet by mouth daily 90 tablet 3    nitroGLYCERIN (NITROSTAT) 0.4 MG SL tablet Place 1 tablet under the tongue as needed for Chest pain 25 tablet 3    b complex vitamins capsule Take 1 capsule by mouth daily      predniSONE (DELTASONE) 10 MG tablet Take 4 tablets by mouth daily for 2 days, THEN 3 tablets daily for 2 days, THEN 2 tablets daily for 2 days, THEN 1 tablet daily for 2 days. 20 tablet 0    levothyroxine (SYNTHROID) 100 MCG tablet Take 100 mcg by mouth Daily      acetaminophen (TYLENOL) 325 MG tablet Take 650 mg by mouth every 6 hours as needed for Pain      Multiple Minerals-Vitamins (JOHNNY-MAG ZINC II PO) Take by mouth       No current facility-administered medications for this visit. Social History     Socioeconomic History    Marital status: Single     Spouse name: None    Number of children: None    Years of education: None    Highest education level: None   Tobacco Use    Smoking status: Former     Types: Cigarettes     Quit date: 2011     Years since quittin.5    Smokeless tobacco: Never   Substance and Sexual Activity    Alcohol use: Yes     Comment: Occasional wine    Drug use: No       Family History   Problem Relation Age of Onset    High Blood Pressure Mother     Diabetes Father     High Blood Pressure Father     Heart Disease Father        Blood pressure 128/84, pulse 70, resp. rate 20, height 5' 6\" (1.676 m), weight 295 lb 9.6 oz (134.1 kg), last menstrual period 2013, not currently breastfeeding.     Physical Exam:    General Appearance: alert and oriented to person, place and time, in no acute distress  Cardiovascular: normal rate, regular rhythm, normal S1 and S2, no murmurs, rubs, clicks, or gallops, distal pulses intact, no carotid bruits, no JVD  Pulmonary/Chest: clear to auscultation bilaterally- no wheezes, rales or rhonchi, normal air movement, no respiratory distress  Abdomen: soft, non-tender, non-distended, normal bowel sounds, no masses   Extremities: no cyanosis, clubbing or edema, pulse   Skin: warm and dry, no rash or erythema  Head: normocephalic and atraumatic  Eyes: pupils equal, round, and reactive to light  Neck: supple and non-tender without mass, no thyromegaly   Musculoskeletal: normal range of motion, no joint swelling, deformity or tenderness  Neurological: alert, oriented, normal speech, no focal findings or movement disorder noted    Lab Data:    Cardiac Enzymes:  No results for input(s): CKTOTAL, CKMB, CKMBINDEX, TROPONINI in the last 72 hours.     CBC:   Lab Results   Component Value Date/Time    WBC 5.8 09/27/2022 11:06 AM    WBC 8.4 12/28/2018 01:25 AM    RBC 4.53 09/27/2022 11:06 AM    HGB 13.2 09/27/2022 11:06 AM    HCT 39.5 09/27/2022 11:06 AM     09/27/2022 11:06 AM     05/01/2021 12:00 AM       CMP:    Lab Results   Component Value Date/Time     09/27/2022 11:06 AM    K 4.2 09/27/2022 11:06 AM     09/27/2022 11:06 AM    CO2 29 09/27/2022 11:06 AM    BUN 9 09/27/2022 11:06 AM    CREATININE 0.63 09/27/2022 11:06 AM    GFRAA >60 05/01/2021 12:00 AM    LABGLOM >60 05/01/2021 12:00 AM    GLUCOSE 79 09/27/2022 11:06 AM    CALCIUM 9.4 09/27/2022 11:06 AM       Hepatic Function Panel:    Lab Results   Component Value Date/Time    ALKPHOS 122 09/27/2022 11:06 AM    ALKPHOS 80 05/01/2021 12:00 AM    ALT 16 09/27/2022 11:06 AM    AST 18 09/27/2022 11:06 AM    PROT 6.7 09/27/2022 11:06 AM    PROT 7.1 05/01/2021 12:00 AM    BILITOT 0.7 09/27/2022 11:06 AM    BILIDIR 0.2 09/27/2022 11:06 AM    LABALBU 4.4 09/27/2022 11:06 AM       Magnesium:  No results found for: MG    PT/INR:    Lab Results   Component Value Date/Time    INR 1.05 12/28/2018 01:25 AM       HgBA1c:  No results found for: LABA1C    FLP:    Lab Results   Component Value Date/Time    TRIG 61 09/27/2022 11:06 AM    HDL 62 09/27/2022 11:06 AM    LDLCALC 84 09/27/2022 11:06 AM    LABVLDL 12 09/27/2022 11:06 AM       TSH:  No results found for: TSH     Diagnosis Orders   1. Tachycardia  83435 - [place] ELECTROCARDIOGRAM, COMPLETE    CBC    Basic Metabolic Panel    Lipid Panel    Hepatic Function Panel      2. Dyslipidemia (high LDL; low HDL)  CBC    Basic Metabolic Panel    Lipid Panel    Hepatic Function Panel           Assessment/Plan    Is lady 46years old lady morbidly obese history of hypothyroidism history of palpitation she has been on metoprolol does control her symptoms to a great degree she was instructed to take an extra dose of the metoprolol if she has the palpitation her EKG showed sinus rhythm nonspecific ST segment change the lab work within the acceptable range. The EKG finding the lab work finding plan of treatment were discussed with her in great details the patient will be seen annually advised about risk factor modification all her questions were answered her medication were reconciled and sent to her pharmacy she will be seen annually thank    Orders Placed This Encounter   Procedures    CBC     Standing Status:   Future     Standing Expiration Date:   1/49/3021    Basic Metabolic Panel     Standing Status:   Future     Standing Expiration Date:   9/28/2023    Lipid Panel     Standing Status:   Future     Standing Expiration Date:   9/28/2023     Order Specific Question:   Is Patient Fasting?/# of Hours     Answer:   12 hours    Hepatic Function Panel     Standing Status:   Future     Standing Expiration Date:   9/28/2023    73451 - [place] ELECTROCARDIOGRAM, COMPLETE       Return in about 1 year (around 9/28/2023) for palpitation.      Anna Perez MD

## 2023-09-25 RX ORDER — SODIUM CHLORIDE 0.9 % (FLUSH) 0.9 %
5-40 SYRINGE (ML) INJECTION EVERY 12 HOURS SCHEDULED
Status: DISCONTINUED | OUTPATIENT
Start: 2023-09-25 | End: 2023-10-02 | Stop reason: HOSPADM

## 2023-09-25 RX ORDER — SODIUM CHLORIDE 0.9 % (FLUSH) 0.9 %
5-40 SYRINGE (ML) INJECTION PRN
Status: DISCONTINUED | OUTPATIENT
Start: 2023-09-25 | End: 2023-10-02 | Stop reason: HOSPADM

## 2023-09-25 RX ORDER — SODIUM CHLORIDE 9 MG/ML
25 INJECTION, SOLUTION INTRAVENOUS PRN
Status: DISCONTINUED | OUTPATIENT
Start: 2023-09-25 | End: 2023-10-02 | Stop reason: HOSPADM

## 2023-09-25 RX ORDER — SODIUM CHLORIDE 9 MG/ML
INJECTION, SOLUTION INTRAVENOUS CONTINUOUS
Status: DISCONTINUED | OUTPATIENT
Start: 2023-09-25 | End: 2023-10-02 | Stop reason: HOSPADM

## 2023-09-28 ENCOUNTER — HOSPITAL ENCOUNTER (OUTPATIENT)
Dept: MAMMOGRAPHY | Age: 53
Discharge: HOME | End: 2023-09-28
Payer: COMMERCIAL

## 2023-09-28 VITALS — WEIGHT: 278 LBS | HEIGHT: 67 IN | BODY MASS INDEX: 43.63 KG/M2

## 2023-09-28 DIAGNOSIS — Z12.39 BREAST SCREENING: ICD-10-CM

## 2023-09-28 PROCEDURE — 77063 BREAST TOMOSYNTHESIS BI: CPT

## 2023-10-02 ENCOUNTER — ANESTHESIA (OUTPATIENT)
Dept: ENDOSCOPY | Age: 53
End: 2023-10-02
Payer: COMMERCIAL

## 2023-10-02 ENCOUNTER — ANESTHESIA EVENT (OUTPATIENT)
Dept: ENDOSCOPY | Age: 53
End: 2023-10-02
Payer: COMMERCIAL

## 2023-10-02 ENCOUNTER — HOSPITAL ENCOUNTER (OUTPATIENT)
Age: 53
Setting detail: OUTPATIENT SURGERY
Discharge: HOME | End: 2023-10-02
Attending: INTERNAL MEDICINE | Admitting: INTERNAL MEDICINE
Payer: COMMERCIAL

## 2023-10-02 VITALS
SYSTOLIC BLOOD PRESSURE: 148 MMHG | TEMPERATURE: 96.9 F | DIASTOLIC BLOOD PRESSURE: 66 MMHG | WEIGHT: 275.2 LBS | RESPIRATION RATE: 17 BRPM | BODY MASS INDEX: 43.19 KG/M2 | OXYGEN SATURATION: 98 % | HEIGHT: 67 IN | HEART RATE: 56 BPM

## 2023-10-02 PROCEDURE — 6360000002 HC RX W HCPCS: Performed by: NURSE ANESTHETIST, CERTIFIED REGISTERED

## 2023-10-02 PROCEDURE — 88305 TISSUE EXAM BY PATHOLOGIST: CPT

## 2023-10-02 PROCEDURE — 3700000001 HC ADD 15 MINUTES (ANESTHESIA): Performed by: INTERNAL MEDICINE

## 2023-10-02 PROCEDURE — 2580000003 HC RX 258: Performed by: INTERNAL MEDICINE

## 2023-10-02 PROCEDURE — 3700000000 HC ANESTHESIA ATTENDED CARE: Performed by: INTERNAL MEDICINE

## 2023-10-02 PROCEDURE — 7100000010 HC PHASE II RECOVERY - FIRST 15 MIN: Performed by: INTERNAL MEDICINE

## 2023-10-02 PROCEDURE — 2500000003 HC RX 250 WO HCPCS: Performed by: NURSE ANESTHETIST, CERTIFIED REGISTERED

## 2023-10-02 PROCEDURE — 3609010600 HC COLONOSCOPY POLYPECTOMY SNARE/COLD BIOPSY: Performed by: INTERNAL MEDICINE

## 2023-10-02 PROCEDURE — 7100000011 HC PHASE II RECOVERY - ADDTL 15 MIN: Performed by: INTERNAL MEDICINE

## 2023-10-02 PROCEDURE — 2709999900 HC NON-CHARGEABLE SUPPLY: Performed by: INTERNAL MEDICINE

## 2023-10-02 RX ORDER — SODIUM CHLORIDE 9 MG/ML
25 INJECTION, SOLUTION INTRAVENOUS PRN
Status: CANCELLED | OUTPATIENT
Start: 2023-10-02

## 2023-10-02 RX ORDER — SODIUM CHLORIDE 0.9 % (FLUSH) 0.9 %
5-40 SYRINGE (ML) INJECTION EVERY 12 HOURS SCHEDULED
Status: CANCELLED | OUTPATIENT
Start: 2023-10-02

## 2023-10-02 RX ORDER — SODIUM CHLORIDE 0.9 % (FLUSH) 0.9 %
5-40 SYRINGE (ML) INJECTION PRN
Status: CANCELLED | OUTPATIENT
Start: 2023-10-02

## 2023-10-02 RX ORDER — PROPOFOL 10 MG/ML
INJECTION, EMULSION INTRAVENOUS PRN
Status: DISCONTINUED | OUTPATIENT
Start: 2023-10-02 | End: 2023-10-02 | Stop reason: SDUPTHER

## 2023-10-02 RX ORDER — CLINDAMYCIN HYDROCHLORIDE 150 MG/1
150 CAPSULE ORAL 3 TIMES DAILY
COMMUNITY
Start: 2023-07-17

## 2023-10-02 RX ORDER — LIDOCAINE HYDROCHLORIDE 20 MG/ML
INJECTION, SOLUTION INFILTRATION; PERINEURAL PRN
Status: DISCONTINUED | OUTPATIENT
Start: 2023-10-02 | End: 2023-10-02 | Stop reason: SDUPTHER

## 2023-10-02 RX ORDER — AMOXICILLIN 500 MG/1
TABLET, FILM COATED ORAL
COMMUNITY
Start: 2023-08-21

## 2023-10-02 RX ORDER — TRAMADOL HYDROCHLORIDE 50 MG/1
TABLET ORAL
COMMUNITY
Start: 2023-08-21

## 2023-10-02 RX ORDER — CHLORHEXIDINE GLUCONATE ORAL RINSE 1.2 MG/ML
SOLUTION DENTAL
COMMUNITY
Start: 2023-08-21

## 2023-10-02 RX ADMIN — PROPOFOL 50 MG: 10 INJECTION, EMULSION INTRAVENOUS at 10:21

## 2023-10-02 RX ADMIN — PROPOFOL 50 MG: 10 INJECTION, EMULSION INTRAVENOUS at 10:11

## 2023-10-02 RX ADMIN — PROPOFOL 50 MG: 10 INJECTION, EMULSION INTRAVENOUS at 10:25

## 2023-10-02 RX ADMIN — PROPOFOL 50 MG: 10 INJECTION, EMULSION INTRAVENOUS at 10:29

## 2023-10-02 RX ADMIN — SODIUM CHLORIDE: 9 INJECTION, SOLUTION INTRAVENOUS at 08:24

## 2023-10-02 RX ADMIN — PROPOFOL 100 MG: 10 INJECTION, EMULSION INTRAVENOUS at 10:08

## 2023-10-02 RX ADMIN — PROPOFOL 50 MG: 10 INJECTION, EMULSION INTRAVENOUS at 10:04

## 2023-10-02 RX ADMIN — PROPOFOL 50 MG: 10 INJECTION, EMULSION INTRAVENOUS at 10:27

## 2023-10-02 RX ADMIN — PROPOFOL 50 MG: 10 INJECTION, EMULSION INTRAVENOUS at 10:23

## 2023-10-02 RX ADMIN — PROPOFOL 50 MG: 10 INJECTION, EMULSION INTRAVENOUS at 10:06

## 2023-10-02 RX ADMIN — PROPOFOL 100 MG: 10 INJECTION, EMULSION INTRAVENOUS at 10:03

## 2023-10-02 RX ADMIN — PROPOFOL 50 MG: 10 INJECTION, EMULSION INTRAVENOUS at 10:32

## 2023-10-02 RX ADMIN — LIDOCAINE HYDROCHLORIDE 100 MG: 20 INJECTION, SOLUTION INFILTRATION; PERINEURAL at 10:03

## 2023-10-02 ASSESSMENT — PAIN - FUNCTIONAL ASSESSMENT: PAIN_FUNCTIONAL_ASSESSMENT: NONE - DENIES PAIN

## 2023-10-02 NOTE — BRIEF OP NOTE
Brief Postoperative Note      Patient: Leslie Bustillos  YOB: 1970  MRN: [id number]    Date of Procedure: 10/2/2023    Pre-Op Diagnosis Codes:     * Screen for colon cancer [Z12.11]     * History of colon polyps [Z86.010]    Post-Op Diagnosis: Same       Procedure(s):  COLONOSCOPY POLYPECTOMY SNARE/COLD BIOPSY    Surgeon(s):   Marie Mckeon MD    Assistant:  * No surgical staff found *    Anesthesia: Monitor Anesthesia Care    Estimated Blood Loss (mL): Minimal    Complications: None    Specimens:   ID Type Source Tests Collected by Time Destination   A : Transverse colon polyp Tissue Colon SURGICAL PATHOLOGY Marie Mckeon MD 10/2/2023 1025        Implants:  * No implants in log *      Drains: * No LDAs found *    Findings: above      Electronically signed by Marie Mckeon MD on 10/2/2023 at 10:40 AM

## 2023-10-02 NOTE — ANESTHESIA POSTPROCEDURE EVALUATION
Department of Anesthesiology  Postprocedure Note    Patient: Scott Harmon  MRN: [id number]  YOB: 1970  Date of evaluation: 10/2/2023      Procedure Summary     Date: 10/02/23 Room / Location: [address] [place] [address] [facility] / [address] [place] [address]    Anesthesia Start: 3889 Anesthesia Stop: 3196    Procedure: COLONOSCOPY POLYPECTOMY SNARE/COLD BIOPSY Diagnosis:       Screen for colon cancer      History of colon polyps      (Screen for colon cancer [Z12.11])      (History of colon polyps [Z86.010])    Surgeons: Veronica Martinez MD Responsible Provider: Trice Hammonds DO    Anesthesia Type: MAC ASA Status: 3          Anesthesia Type: No value filed.     Malcolm Phase I: Malcolm Score: 10    Malcolm Phase II:        Anesthesia Post Evaluation    Patient location during evaluation: bedside  Patient participation: complete - patient participated  Level of consciousness: awake  Pain score: 0  Airway patency: patent  Nausea & Vomiting: no nausea and no vomiting  Complications: no  Cardiovascular status: hemodynamically stable  Respiratory status: acceptable  Hydration status: euvolemic  Pain management: satisfactory to patient

## 2023-10-02 NOTE — PROGRESS NOTES
Scope # . Colonoscopy completed, tolerated well. 1 polyp removed with cold snare, 1 jar labeled and sent to lab for processing. Photos taken.

## 2023-10-02 NOTE — PROGRESS NOTES
Recovery mode, pt denies discomfort. Passing gas, taking in fluids. Dr. Kyle Finney discussed findings with pt and responsible party. Discharge instructions provided and understanding verbalized.

## 2023-10-02 NOTE — H&P
[place] [address]                              HISTORY AND PHYSICAL    PATIENT NAME: Ana Antunez                 :        1970  MED REC NO:   [id number]                           ROOM:  ACCOUNT NO:   [de-identified]                           ADMIT DATE: 10/02/2023  PROVIDER:     Trell Gerard M.D. INDICATION:  History of polyp. Polyp was difficult to remove. Removed  it in piecemeal fashion. HISTORY OF PRESENT ILLNESS:  The patient is a 59-year-old female who we  have previously seen for colonoscopy. This was done in 2021. It is  a 2 to 2.5 cm polyp in the transverse colon, difficult to remove. It  was marked with [place] ink. We plan to repeat colonoscopy today. I did  suggest that she have this done here. PAST MEDICAL HISTORY:  As previously listed and unchanged. ALLERGIES:  AS PREVIOUSLY LISTED AND UNCHANGED. MEDICATIONS:  As previously listed and unchanged. SOCIAL HISTORY:  As previously listed and unchanged. FAMILY HISTORY:  As previously listed and unchanged. REVIEW OF SYSTEMS:  As previously listed and unchanged. PHYSICAL EXAMINATION:  GENERAL:  Awake, alert, and oriented x3. VITAL SIGNS:  The patient is afebrile, heart rate 55, respiratory rate  18, blood pressure 122/60, and oxygen saturation 99%. HEENT:  Normocephalic and atraumatic. NECK:  Supple without JVD. LUNGS:  Clear. HEART:  Regular rate. ABDOMEN:  Soft, nontender. RECTAL:  Will be done with colonoscopy. EXTREMITIES:  Without edema. NEUROLOGIC:  Awake, alert, and oriented x3. IMPRESSION:  History of colon polyps. PLAN:  Colonoscopy today. BOLIVAR Gerard M.D.    D: 10/02/2023 9:57:37       T: 10/02/2023 11:47:16     [id number]  Job#: [id number]     Doc#: [id number]    CC:

## 2023-10-02 NOTE — OP NOTE
Operative Note      Patient: Harish Reddy  YOB: 1970  MRN: [id number]    Date of Procedure: 10/2/2023    Pre-Op Diagnosis Codes:     * Screen for colon cancer [Z12.11]     * History of colon polyps [Z86.010]    Post-Op Diagnosis: Same       Procedure(s):  COLONOSCOPY POLYPECTOMY SNARE/COLD BIOPSY    Surgeon(s):   Joseph Doherty MD    Assistant:   * No surgical staff found *    Anesthesia: Monitor Anesthesia Care    Estimated Blood Loss (mL): Minimal    Complications: None    Specimens:   ID Type Source Tests Collected by Time Destination   A : Transverse colon polyp Tissue Colon SURGICAL PATHOLOGY Joseph Doherty MD 10/2/2023 1025        Implants:  * No implants in log *      Drains: * No LDAs found *    Findings: above        Detailed Description of Procedure:   dictated    Electronically signed by Joseph Doherty MD on 10/2/2023 at 10:40 AM

## 2023-10-03 NOTE — OP NOTE
[place] [address]                                OPERATIVE REPORT    PATIENT NAME: Armando Ramos                 :        1970  MED REC NO:   [id number]                           ROOM:  ACCOUNT NO:   [de-identified]                           ADMIT DATE: 10/02/2023  PROVIDER:     Margoth Burris M.D.    Robert Sidhus:  10/02/2023    PROCEDURE:  Colonoscopy. SURGEON: Margoth Burris M.D. INDICATION:  History of large colon polyp. ANESTHESIA:  IV Diprivan per Anesthesia. DESCRIPTION OF PROCEDURE:  Prior to procedure, risks, benefits, and  complications (including but not limited to the following; bleeding,  infection, perforation, emergency surgery, stroke, heart attack, death,  possible anesthetic risks, and the fact that the procedure is not 100%  accurate or successful), indications, and alternatives of colonoscopy  were explained to the patient. The patient understood and agreed to the  procedure. All questions were answered. With the patient in the left lateral decubitus position, digital rectal  exam was done, which was normal.  Colonoscope was introduced into the  rectum. Mucosa appeared normal.  Colonoscope was advanced to the  rectosigmoid colon, some retained liquid stools were noted. Colonoscope  was advanced to the descending colon, transverse colon, ascending colon,  and cecum. Colon was somewhat redundant colon. Colonoscope had been  straightened sometimes to reach the cecum. The cecum was confirmed by  visualization of the ileocecal valve, visualization of the appendiceal  orifice, and visualization of the entire terminal ileum. Colonoscope was traced back to the ascending colon, hepatic flexure, and  re-introduced into the cecum for \"second look. \"  Zarate Deter was  retraced again through the ascending colon and transverse colon.    Transverse colon, near [place] ink, there was a

## 2023-10-27 ENCOUNTER — OFFICE VISIT (OUTPATIENT)
Dept: CARDIOLOGY CLINIC | Age: 53
End: 2023-10-27
Payer: COMMERCIAL

## 2023-10-27 VITALS
DIASTOLIC BLOOD PRESSURE: 82 MMHG | BODY MASS INDEX: 43.95 KG/M2 | HEIGHT: 67 IN | SYSTOLIC BLOOD PRESSURE: 138 MMHG | RESPIRATION RATE: 18 BRPM | WEIGHT: 280 LBS | HEART RATE: 52 BPM

## 2023-10-27 DIAGNOSIS — R55 POSTURAL DIZZINESS WITH PRESYNCOPE: ICD-10-CM

## 2023-10-27 DIAGNOSIS — R42 POSTURAL DIZZINESS WITH PRESYNCOPE: ICD-10-CM

## 2023-10-27 DIAGNOSIS — R42 DIZZINESS: Primary | ICD-10-CM

## 2023-10-27 PROCEDURE — 93000 ELECTROCARDIOGRAM COMPLETE: CPT | Performed by: INTERNAL MEDICINE

## 2023-10-27 RX ORDER — LEVOTHYROXINE SODIUM 88 UG/1
TABLET ORAL
COMMUNITY
Start: 2023-10-09

## 2023-10-27 RX ORDER — ALPRAZOLAM 0.5 MG/1
0.5 TABLET ORAL NIGHTLY PRN
COMMUNITY

## 2023-10-27 RX ORDER — NITROGLYCERIN 0.4 MG/1
0.4 TABLET SUBLINGUAL PRN
Qty: 25 TABLET | Refills: 3 | Status: SHIPPED | OUTPATIENT
Start: 2023-10-27

## 2023-10-27 RX ORDER — METOPROLOL SUCCINATE 25 MG/1
25 TABLET, EXTENDED RELEASE ORAL DAILY
Qty: 30 TABLET | Refills: 3 | Status: SHIPPED | OUTPATIENT
Start: 2023-10-27

## 2023-10-27 ASSESSMENT — ENCOUNTER SYMPTOMS: RESPIRATORY NEGATIVE: 1

## 2023-10-27 NOTE — PROGRESS NOTES
release tablet    Cardiac event monitor    Echo 2d w doppler w color w contrast    Stress test Piotr Butts)            Assessment and Plan:  Daquan Pina is a pleasant 77-year-old female presents today for yearly follow-up. Daquan Pina states that over the last year she has had 3 episodes of breaking out into a horrible sweat feeling dizzy presyncopal she had to sit down as she thought she was going to land on the floor. She denies any type of palpitations with this she denies chest pain she did shortness of breath she was not sure what was going on it lasted approximately 10 to 15 minutes and then went away on its own. She went to see her family doctor who referred her to us. She has been taking metoprolol 25 mg daily it is not XL. She does take Xanax as needed to help her sleep at night she has no obstructive sleep apnea symptoms. She recently had her TSH tested and her Synthroid adjusted. He states that her father did have some stents put in when he was in his 62s but no other heart disease in her family. Dizziness/presyncope/diaphoresis-could be arrhythmia  Denies any palpitation with this    -Event monitor\stress test\echo --down the line may be tilt table /loop      HTN-stable  Family history of CAD her father mid 62s stent        Currently Khalif Douglass is concerned about the above symptoms. Again this is only happened 3 times in 1 year. At the moment we will order a 30-day event monitor Lexiscan stress test and echocardiogram to start with. I discussed with her if these should be normal we may need to do a loop monitor and we may need to do a tilt table test.  I did stop her metoprolol daily and put her on Toprol XL 25 mg daily I told her to start taking that tomorrow.   I will see her back in 6 weeks time and we will see how things look how she is feeling and will we need to go      Orders Placed This Encounter   Procedures    Cardiac event monitor     Standing Status:   Future     Standing Expiration Date:

## 2023-10-31 DIAGNOSIS — R42 DIZZINESS: ICD-10-CM

## 2023-10-31 DIAGNOSIS — R55 POSTURAL DIZZINESS WITH PRESYNCOPE: ICD-10-CM

## 2023-10-31 DIAGNOSIS — R42 POSTURAL DIZZINESS WITH PRESYNCOPE: ICD-10-CM

## 2023-10-31 RX ORDER — METOPROLOL SUCCINATE 25 MG/1
25 TABLET, EXTENDED RELEASE ORAL DAILY
Qty: 90 TABLET | Refills: 0 | Status: SHIPPED | OUTPATIENT
Start: 2023-10-31

## 2023-11-15 ENCOUNTER — APPOINTMENT (OUTPATIENT)
Dept: NUCLEAR MEDICINE | Age: 53
End: 2023-11-15
Payer: COMMERCIAL

## 2023-11-15 ENCOUNTER — APPOINTMENT (OUTPATIENT)
Age: 53
End: 2023-11-15
Payer: COMMERCIAL

## 2023-11-15 ENCOUNTER — HOSPITAL ENCOUNTER (OUTPATIENT)
Age: 53
Discharge: HOME | End: 2023-11-17
Payer: COMMERCIAL

## 2023-11-15 DIAGNOSIS — R42 POSTURAL DIZZINESS WITH PRESYNCOPE: ICD-10-CM

## 2023-11-15 DIAGNOSIS — R55 POSTURAL DIZZINESS WITH PRESYNCOPE: ICD-10-CM

## 2023-11-15 DIAGNOSIS — R42 DIZZINESS: ICD-10-CM

## 2023-11-15 PROCEDURE — 93270 REMOTE 30 DAY ECG REV/REPORT: CPT

## 2023-11-21 DIAGNOSIS — R42 DIZZINESS: ICD-10-CM

## 2023-11-21 DIAGNOSIS — R42 POSTURAL DIZZINESS WITH PRESYNCOPE: ICD-10-CM

## 2023-11-21 DIAGNOSIS — R55 POSTURAL DIZZINESS WITH PRESYNCOPE: ICD-10-CM

## 2023-11-21 RX ORDER — METOPROLOL SUCCINATE 25 MG/1
25 TABLET, EXTENDED RELEASE ORAL DAILY
Qty: 90 TABLET | Refills: 3 | Status: SHIPPED | OUTPATIENT
Start: 2023-11-21

## 2023-12-13 PROBLEM — F41.9 ANXIETY: Status: ACTIVE | Noted: 2023-12-13

## 2024-09-25 ENCOUNTER — HOSPITAL ENCOUNTER (OUTPATIENT)
Dept: ULTRASOUND IMAGING | Age: 54
Discharge: HOME | End: 2024-09-25
Payer: COMMERCIAL

## 2024-09-25 DIAGNOSIS — R10.30 LOWER ABDOMINAL PAIN: ICD-10-CM

## 2024-09-25 PROCEDURE — 76856 US EXAM PELVIC COMPLETE: CPT

## 2024-10-01 ENCOUNTER — HOSPITAL ENCOUNTER (OUTPATIENT)
Age: 54
Discharge: HOME | End: 2024-10-03
Payer: COMMERCIAL

## 2024-10-01 VITALS
DIASTOLIC BLOOD PRESSURE: 84 MMHG | WEIGHT: 271 LBS | HEIGHT: 66 IN | SYSTOLIC BLOOD PRESSURE: 128 MMHG | BODY MASS INDEX: 43.55 KG/M2

## 2024-10-01 DIAGNOSIS — R55 SYNCOPE, UNSPECIFIED SYNCOPE TYPE: ICD-10-CM

## 2024-10-01 LAB
ECHO AV CUSP MM: 1.9 CM
ECHO AV PEAK GRADIENT: 10 MMHG
ECHO AV PEAK VELOCITY: 1.6 M/S
ECHO AV VELOCITY RATIO: 0.69
ECHO BSA: 2.39 M2
ECHO LA AREA 2C: 13 CM2
ECHO LA AREA 4C: 18 CM2
ECHO LA DIAMETER INDEX: 1.71 CM/M2
ECHO LA DIAMETER: 3.9 CM
ECHO LA MAJOR AXIS: 5.2 CM
ECHO LA MINOR AXIS: 4.5 CM
ECHO LA VOL BP: 42 ML (ref 22–52)
ECHO LA VOL MOD A2C: 31 ML (ref 22–52)
ECHO LA VOL MOD A4C: 49 ML (ref 22–52)
ECHO LA VOL/BSA BIPLANE: 18 ML/M2 (ref 16–34)
ECHO LA VOLUME INDEX MOD A2C: 14 ML/M2 (ref 16–34)
ECHO LA VOLUME INDEX MOD A4C: 21 ML/M2 (ref 16–34)
ECHO LV E' LATERAL VELOCITY: 10.8 CM/S
ECHO LV E' SEPTAL VELOCITY: 9.9 CM/S
ECHO LV EF PHYSICIAN: 60 %
ECHO LV FRACTIONAL SHORTENING: 28 % (ref 28–44)
ECHO LV INTERNAL DIMENSION DIASTOLE INDEX: 2.54 CM/M2
ECHO LV INTERNAL DIMENSION DIASTOLIC: 5.8 CM (ref 3.9–5.3)
ECHO LV INTERNAL DIMENSION SYSTOLIC INDEX: 1.84 CM/M2
ECHO LV INTERNAL DIMENSION SYSTOLIC: 4.2 CM
ECHO LV IVSD: 0.9 CM (ref 0.6–0.9)
ECHO LV MASS 2D: 218.1 G (ref 67–162)
ECHO LV MASS INDEX 2D: 95.7 G/M2 (ref 43–95)
ECHO LV POSTERIOR WALL DIASTOLIC: 1 CM (ref 0.6–0.9)
ECHO LV RELATIVE WALL THICKNESS RATIO: 0.34
ECHO LVOT PEAK GRADIENT: 5 MMHG
ECHO LVOT PEAK VELOCITY: 1.1 M/S
ECHO MV A VELOCITY: 0.96 M/S
ECHO MV E DECELERATION TIME (DT): 218 MS
ECHO MV E VELOCITY: 1.07 M/S
ECHO MV E/A RATIO: 1.11
ECHO MV E/E' LATERAL: 9.91
ECHO MV E/E' RATIO (AVERAGED): 10.36
ECHO MV E/E' SEPTAL: 10.81
ECHO MV REGURGITANT PEAK GRADIENT: 67 MMHG
ECHO MV REGURGITANT PEAK VELOCITY: 4.1 M/S
ECHO PV MAX VELOCITY: 1.2 M/S
ECHO PV PEAK GRADIENT: 6 MMHG
ECHO RV INTERNAL DIMENSION: 3.3 CM
ECHO TV E WAVE: 0.7 M/S

## 2024-10-01 PROCEDURE — 93306 TTE W/DOPPLER COMPLETE: CPT

## 2024-10-01 PROCEDURE — 93306 TTE W/DOPPLER COMPLETE: CPT | Performed by: INTERNAL MEDICINE

## 2024-10-02 ENCOUNTER — OFFICE VISIT (OUTPATIENT)
Dept: CARDIOLOGY CLINIC | Age: 54
End: 2024-10-02
Payer: COMMERCIAL

## 2024-10-02 VITALS
SYSTOLIC BLOOD PRESSURE: 164 MMHG | HEIGHT: 67 IN | DIASTOLIC BLOOD PRESSURE: 84 MMHG | BODY MASS INDEX: 42.85 KG/M2 | WEIGHT: 273 LBS | HEART RATE: 56 BPM

## 2024-10-02 DIAGNOSIS — R42 DIZZINESS: ICD-10-CM

## 2024-10-02 DIAGNOSIS — R00.2 PALPITATIONS: Primary | ICD-10-CM

## 2024-10-02 DIAGNOSIS — R42 POSTURAL DIZZINESS WITH PRESYNCOPE: ICD-10-CM

## 2024-10-02 DIAGNOSIS — R55 POSTURAL DIZZINESS WITH PRESYNCOPE: ICD-10-CM

## 2024-10-02 PROCEDURE — 93000 ELECTROCARDIOGRAM COMPLETE: CPT | Performed by: INTERNAL MEDICINE

## 2024-10-02 PROCEDURE — 99214 OFFICE O/P EST MOD 30 MIN: CPT | Performed by: INTERNAL MEDICINE

## 2024-10-02 RX ORDER — NITROGLYCERIN 0.4 MG/1
0.4 TABLET SUBLINGUAL PRN
Qty: 25 TABLET | Refills: 3 | Status: SHIPPED | OUTPATIENT
Start: 2024-10-02

## 2024-10-02 RX ORDER — METOPROLOL SUCCINATE 25 MG/1
25 TABLET, EXTENDED RELEASE ORAL DAILY
Qty: 90 TABLET | Refills: 3 | Status: SHIPPED | OUTPATIENT
Start: 2024-10-02

## 2024-10-02 NOTE — PATIENT INSTRUCTIONS
Your assistants today were: BRIAN Mcginnis and YEMI Bonds  Your provider today was Dr. Justice  Phone number: [phone]

## 2024-10-02 NOTE — PROGRESS NOTES
New to provider, former Dr. Le patient.     EKG done today.    Patient states she can sometimes feel her heart skip a beat. States used to have palpitations. She had an echo done yesterday.     Also states she starts to sweat profusely and feels tingly and like she will pass out if she stands too long.     Reports intermittent lightheadedness.    Denies chest pain, palpitations, shortness of breath, and edema.

## 2024-10-02 NOTE — PROGRESS NOTES
[facility] [place] [facility]  [facility] CARDIOLOGY  [address]  [address]  [facility] [address]  Dept: [phone]  Dept Fax: [phone]  Loc: [phone]    Visit Date: 10/2/2024    Ms. Aviles is a 54 y.o. female  who presented for:  Establish cardiac care   Previously followed by Dr Le   H/o palpitations and dizziness   Hypertension   HPI:   HPI   Casandra Aviles is a pleasant 54 year old female patient who  has a past medical history of Hypertension. She has FH of CAD, her father had PCI at age of 59. Patient was previously evaluated by Dr Le for dizziness, palpitations. Cardiac event monitor 11/2023 revealed normal sinus rhythm. Echocardiogram 10/2024 revealed an EF of 55-60%. Apparently, a stress test was planned but was denied by her insurance. However, a coronary CT scan revealed a calcium score of zero. She was started on Toprol XL per her prior cardiologist. Patient denies chest pain, shortness of breath, dyspnea on exertion.     Current Outpatient Medications:     ALPRAZolam (XANAX) 0.5 MG tablet, Take 1 tablet by mouth 2 times daily as needed for Anxiety for up to 180 days. Max Daily Amount: 1 mg, Disp: 135 tablet, Rfl: 1    metoprolol succinate (TOPROL XL) 25 MG extended release tablet, Take 1 tablet by mouth daily, Disp: 90 tablet, Rfl: 3    levothyroxine (SYNTHROID) 88 MCG tablet, , Disp: , Rfl:     nitroGLYCERIN (NITROSTAT) 0.4 MG SL tablet, Place 1 tablet under the tongue as needed for Chest pain (Patient not taking: Reported on 9/6/2024), Disp: 25 tablet, Rfl: 3    Past Medical History  Casandra DUBOIS  has a past medical history of Hypertension.    Social History  Caasndra DUBOIS  reports that she quit smoking about 13 years ago. Her smoking use included cigarettes. She has never used smokeless tobacco. She reports current alcohol use. She reports that she does not use drugs.    Family History  Casandra DUBOIS family history includes Diabetes in her father; Heart

## 2024-10-25 ENCOUNTER — HOSPITAL ENCOUNTER (OUTPATIENT)
Dept: GENERAL RADIOLOGY | Age: 54
Discharge: HOME | End: 2024-10-25
Payer: COMMERCIAL

## 2024-10-25 ENCOUNTER — HOSPITAL ENCOUNTER (OUTPATIENT)
Dept: MAMMOGRAPHY | Age: 54
Discharge: HOME | End: 2024-10-25
Payer: COMMERCIAL

## 2024-10-25 DIAGNOSIS — R10.30 LOWER ABDOMINAL PAIN: ICD-10-CM

## 2024-10-25 DIAGNOSIS — Z12.39 BREAST SCREENING: ICD-10-CM

## 2024-10-25 PROCEDURE — 74018 RADEX ABDOMEN 1 VIEW: CPT

## 2024-10-25 PROCEDURE — 77063 BREAST TOMOSYNTHESIS BI: CPT

## 2025-04-29 ENCOUNTER — HOSPITAL ENCOUNTER (OUTPATIENT)
Dept: GENERAL RADIOLOGY | Age: 55
Discharge: HOME | End: 2025-04-29
Attending: FAMILY MEDICINE
Payer: COMMERCIAL

## 2025-04-29 ENCOUNTER — HOSPITAL ENCOUNTER (OUTPATIENT)
Age: 55
Discharge: HOME | End: 2025-04-29
Attending: FAMILY MEDICINE
Payer: COMMERCIAL

## 2025-04-29 ENCOUNTER — HOSPITAL ENCOUNTER (OUTPATIENT)
Dept: CT IMAGING | Age: 55
Discharge: HOME | End: 2025-04-29
Attending: FAMILY MEDICINE
Payer: COMMERCIAL

## 2025-04-29 DIAGNOSIS — R10.9 ABDOMINAL PAIN, UNSPECIFIED ABDOMINAL LOCATION: ICD-10-CM

## 2025-04-29 DIAGNOSIS — M54.50 ACUTE LOW BACK PAIN, UNSPECIFIED BACK PAIN LATERALITY, UNSPECIFIED WHETHER SCIATICA PRESENT: ICD-10-CM

## 2025-04-29 PROCEDURE — 6360000004 HC RX CONTRAST MEDICATION: Performed by: FAMILY MEDICINE

## 2025-04-29 PROCEDURE — 72100 X-RAY EXAM L-S SPINE 2/3 VWS: CPT

## 2025-04-29 PROCEDURE — 74177 CT ABD & PELVIS W/CONTRAST: CPT

## 2025-04-29 RX ORDER — IOPAMIDOL 755 MG/ML
100 INJECTION, SOLUTION INTRAVASCULAR
Status: COMPLETED | OUTPATIENT
Start: 2025-04-29 | End: 2025-04-29

## 2025-04-29 RX ADMIN — IOPAMIDOL 100 ML: 755 INJECTION, SOLUTION INTRAVENOUS at 17:47

## (undated) DEVICE — BIOGUARD A/W CLEANING ADAPTER

## (undated) DEVICE — GLOVE ORTHO 8   MSG9480